# Patient Record
Sex: FEMALE | Race: OTHER | NOT HISPANIC OR LATINO | URBAN - METROPOLITAN AREA
[De-identification: names, ages, dates, MRNs, and addresses within clinical notes are randomized per-mention and may not be internally consistent; named-entity substitution may affect disease eponyms.]

---

## 2021-10-21 VITALS
SYSTOLIC BLOOD PRESSURE: 132 MMHG | TEMPERATURE: 98 F | DIASTOLIC BLOOD PRESSURE: 85 MMHG | HEART RATE: 92 BPM | RESPIRATION RATE: 16 BRPM | HEIGHT: 65 IN | OXYGEN SATURATION: 99 % | WEIGHT: 134.92 LBS

## 2021-10-21 NOTE — ASU PREOP CHECKLIST - SELECT TESTS ORDERED
CBC/CMP/PT/PTT/INR/COVID-19 CBC/CMP/PT/PTT/INR/UCG/COVID-19 87/CBC/CMP/PT/PTT/INR/Type and Screen/UCG/CXR/COVID-19

## 2021-10-22 ENCOUNTER — OUTPATIENT (OUTPATIENT)
Dept: OUTPATIENT SERVICES | Facility: HOSPITAL | Age: 46
LOS: 1 days | Discharge: HOME | End: 2021-10-22
Payer: COMMERCIAL

## 2021-10-22 VITALS — SYSTOLIC BLOOD PRESSURE: 133 MMHG | HEART RATE: 108 BPM | RESPIRATION RATE: 21 BRPM | DIASTOLIC BLOOD PRESSURE: 78 MMHG

## 2021-10-22 DIAGNOSIS — Z98.891 HISTORY OF UTERINE SCAR FROM PREVIOUS SURGERY: Chronic | ICD-10-CM

## 2021-10-22 DIAGNOSIS — Z98.890 OTHER SPECIFIED POSTPROCEDURAL STATES: Chronic | ICD-10-CM

## 2021-10-22 LAB
BLD GP AB SCN SERPL QL: NEGATIVE — SIGNIFICANT CHANGE UP
GLUCOSE BLDC GLUCOMTR-MCNC: 161 MG/DL — HIGH (ref 70–99)
GLUCOSE BLDC GLUCOMTR-MCNC: 87 MG/DL — SIGNIFICANT CHANGE UP (ref 70–99)
RH IG SCN BLD-IMP: POSITIVE — SIGNIFICANT CHANGE UP

## 2021-10-22 PROCEDURE — 88305 TISSUE EXAM BY PATHOLOGIST: CPT | Mod: 26

## 2021-10-22 PROCEDURE — 88307 TISSUE EXAM BY PATHOLOGIST: CPT | Mod: 26

## 2021-10-22 RX ORDER — (INSULIN DEGLUDEC AND LIRAGLUTIDE) 100; 3.6 [IU]/ML; MG/ML
0 INJECTION, SOLUTION SUBCUTANEOUS
Qty: 0 | Refills: 0 | DISCHARGE

## 2021-10-22 RX ORDER — ACETAMINOPHEN 500 MG
1000 TABLET ORAL EVERY 6 HOURS
Refills: 0 | Status: DISCONTINUED | OUTPATIENT
Start: 2021-10-22 | End: 2021-10-23

## 2021-10-22 RX ORDER — METFORMIN HYDROCHLORIDE 850 MG/1
1 TABLET ORAL
Qty: 0 | Refills: 0 | DISCHARGE

## 2021-10-22 RX ORDER — GLIMEPIRIDE 1 MG
1 TABLET ORAL
Qty: 0 | Refills: 0 | DISCHARGE

## 2021-10-22 RX ORDER — BUPIVACAINE 13.3 MG/ML
20 INJECTION, SUSPENSION, LIPOSOMAL INFILTRATION ONCE
Refills: 0 | Status: DISCONTINUED | OUTPATIENT
Start: 2021-10-22 | End: 2021-10-23

## 2021-10-22 RX ORDER — SIMETHICONE 80 MG/1
80 TABLET, CHEWABLE ORAL EVERY 6 HOURS
Refills: 0 | Status: DISCONTINUED | OUTPATIENT
Start: 2021-10-22 | End: 2021-10-23

## 2021-10-22 RX ORDER — HYDROMORPHONE HYDROCHLORIDE 2 MG/ML
0.2 INJECTION INTRAMUSCULAR; INTRAVENOUS; SUBCUTANEOUS
Refills: 0 | Status: DISCONTINUED | OUTPATIENT
Start: 2021-10-22 | End: 2021-10-23

## 2021-10-22 RX ORDER — ACETAMINOPHEN 500 MG
2 TABLET ORAL
Qty: 0 | Refills: 0 | DISCHARGE
Start: 2021-10-22

## 2021-10-22 RX ORDER — OXYCODONE HYDROCHLORIDE 5 MG/1
5 TABLET ORAL
Refills: 0 | Status: DISCONTINUED | OUTPATIENT
Start: 2021-10-22 | End: 2021-10-23

## 2021-10-22 RX ORDER — LISINOPRIL 2.5 MG/1
1 TABLET ORAL
Qty: 0 | Refills: 0 | DISCHARGE

## 2021-10-22 RX ORDER — KETOROLAC TROMETHAMINE 30 MG/ML
30 SYRINGE (ML) INJECTION EVERY 8 HOURS
Refills: 0 | Status: COMPLETED | OUTPATIENT
Start: 2021-10-22 | End: 2021-10-23

## 2021-10-22 RX ORDER — PHENAZOPYRIDINE HCL 100 MG
200 TABLET ORAL ONCE
Refills: 0 | Status: COMPLETED | OUTPATIENT
Start: 2021-10-22 | End: 2021-10-22

## 2021-10-22 RX ORDER — ROSUVASTATIN CALCIUM 5 MG/1
1 TABLET ORAL
Qty: 0 | Refills: 0 | DISCHARGE

## 2021-10-22 RX ORDER — ONDANSETRON 8 MG/1
8 TABLET, FILM COATED ORAL EVERY 8 HOURS
Refills: 0 | Status: DISCONTINUED | OUTPATIENT
Start: 2021-10-22 | End: 2021-10-23

## 2021-10-22 RX ADMIN — OXYCODONE HYDROCHLORIDE 5 MILLIGRAM(S): 5 TABLET ORAL at 15:31

## 2021-10-22 RX ADMIN — OXYCODONE HYDROCHLORIDE 5 MILLIGRAM(S): 5 TABLET ORAL at 16:31

## 2021-10-22 RX ADMIN — Medication 200 MILLIGRAM(S): at 06:42

## 2021-10-22 NOTE — BRIEF OPERATIVE NOTE - NSICDXBRIEFPREOP_GEN_ALL_CORE_FT
PRE-OP DIAGNOSIS:  Fibroid uterus 22-Oct-2021 11:34:24  Ingrid Russo  Endometriosis 22-Oct-2021 11:34:33  Ingrid Russo

## 2021-10-22 NOTE — ASU DISCHARGE PLAN (ADULT/PEDIATRIC) - ASU DC SPECIAL INSTRUCTIONSFT
- Nothing in vagina - no intercourse, tampons, or douching until cleared by your doctor.   - Avoid swimming, tub baths, strenuous activity and heavy lifting until cleared by your doctor.   - Showering is ok.   - Continue oral pain medications as needed for pain.    - Follow up in office in 1-2 weeks for your postoperative visit.  Call the office to confirm your follow up appointment.   - Call the office sooner if you develop severe pain, heavy vaginal bleeding greater than 2 pads in 2 hours, or fevers greater than 100.4 F. Go to the closest emergency room for any of these symptoms if you are not able to contact your doctor.

## 2021-10-22 NOTE — ASU DISCHARGE PLAN (ADULT/PEDIATRIC) - CARE PROVIDER_API CALL
Maya Espinal)  Obstetrics and Gynecology  [address] [place] [address], [address]  [place] [address]  Phone: [phone]  Fax: [phone]  Follow Up Time:

## 2021-10-22 NOTE — BRIEF OPERATIVE NOTE - OPERATION/FINDINGS
Normal external vagina, 14 wk size uterus, mobile descended uterus. V-Care manipulator. On inspection, 14 wk size uterus with 5 cm R broad ligament pedunculated fibroid, ovaries intact. S/p BTL. Robotic-assisted total laparoscopic hysterectomy. The uterus was delivered through the vagina. The cuff was repaired with 0 V-lock suture in 2 layers. Endometriosis excision from L ovarian fossa and left uterolysis. Superficial vagina laceration was repaired with 2-0 vicryl. Skin closed with 4-0 monocryl. Surgiceal powder used for hemostasis. EBL 50 cc.

## 2021-10-22 NOTE — BRIEF OPERATIVE NOTE - NSICDXBRIEFPOSTOP_GEN_ALL_CORE_FT
POST-OP DIAGNOSIS:  Fibroid uterus 22-Oct-2021 11:34:45  Ingrid Russo  Endometriosis 22-Oct-2021 11:34:52  Ingrid Russo

## 2021-10-22 NOTE — PACU DISCHARGE NOTE - COMMENTS
Discharge criteria met and patient discharged by Anesthesia. Instructions given to patient and family. IV removed as per protocol. Discharge criteria met and patient discharged by Anesthesia. Instructions given to patient and family. IV removed as per protocol. Patient will escorted to lobby where her  awaits to take her home via car.

## 2021-10-22 NOTE — BRIEF OPERATIVE NOTE - NSICDXBRIEFPROCEDURE_GEN_ALL_CORE_FT
PROCEDURES:  Robot-assisted laparoscopic total hysterectomy using da Almaz Xi with cystoscopy 22-Oct-2021 11:33:27  Ingrid Russo  Excision, endometrioma, laparoscopic 22-Oct-2021 11:33:48 Robotic assisted Ingrid Russo

## 2021-10-23 PROCEDURE — S2900: CPT

## 2021-10-23 PROCEDURE — 57425 LAPAROSCOPY SURG COLPOPEXY: CPT

## 2021-10-23 PROCEDURE — 58571 TLH W/T/O 250 G OR LESS: CPT

## 2021-10-23 PROCEDURE — 88307 TISSUE EXAM BY PATHOLOGIST: CPT

## 2021-10-23 PROCEDURE — 86900 BLOOD TYPING SEROLOGIC ABO: CPT

## 2021-10-23 PROCEDURE — 86850 RBC ANTIBODY SCREEN: CPT

## 2021-10-23 PROCEDURE — 88305 TISSUE EXAM BY PATHOLOGIST: CPT

## 2021-10-23 PROCEDURE — 82962 GLUCOSE BLOOD TEST: CPT

## 2021-10-23 PROCEDURE — 86901 BLOOD TYPING SEROLOGIC RH(D): CPT

## 2021-11-03 LAB — SURGICAL PATHOLOGY STUDY: SIGNIFICANT CHANGE UP
